# Patient Record
Sex: MALE | Race: BLACK OR AFRICAN AMERICAN | Employment: UNEMPLOYED | URBAN - NONMETROPOLITAN AREA
[De-identification: names, ages, dates, MRNs, and addresses within clinical notes are randomized per-mention and may not be internally consistent; named-entity substitution may affect disease eponyms.]

---

## 2019-04-08 ENCOUNTER — APPOINTMENT (OUTPATIENT)
Dept: GENERAL RADIOLOGY | Age: 24
End: 2019-04-08
Payer: COMMERCIAL

## 2019-04-08 ENCOUNTER — HOSPITAL ENCOUNTER (EMERGENCY)
Age: 24
Discharge: HOME | End: 2019-04-08
Attending: EMERGENCY MEDICINE
Payer: COMMERCIAL

## 2019-04-08 VITALS
HEART RATE: 70 BPM | TEMPERATURE: 98 F | DIASTOLIC BLOOD PRESSURE: 89 MMHG | SYSTOLIC BLOOD PRESSURE: 133 MMHG | BODY MASS INDEX: 29.52 KG/M2 | OXYGEN SATURATION: 99 % | RESPIRATION RATE: 18 BRPM | WEIGHT: 230 LBS | HEIGHT: 74 IN

## 2019-04-08 DIAGNOSIS — S56.912A FOREARM STRAIN, LEFT, INITIAL ENCOUNTER: Primary | ICD-10-CM

## 2019-04-08 PROCEDURE — 96372 THER/PROPH/DIAG INJ SC/IM: CPT

## 2019-04-08 PROCEDURE — 99283 EMERGENCY DEPT VISIT LOW MDM: CPT

## 2019-04-08 PROCEDURE — 73090 X-RAY EXAM OF FOREARM: CPT

## 2019-04-08 PROCEDURE — 6360000002 HC RX W HCPCS: Performed by: EMERGENCY MEDICINE

## 2019-04-08 RX ORDER — NAPROXEN 500 MG/1
500 TABLET ORAL 2 TIMES DAILY PRN
Qty: 30 TABLET | Refills: 0 | Status: SHIPPED | OUTPATIENT
Start: 2019-04-08 | End: 2019-04-18

## 2019-04-08 RX ORDER — ACETAMINOPHEN 500 MG
1000 TABLET ORAL EVERY 8 HOURS PRN
Qty: 30 TABLET | Refills: 0 | Status: SHIPPED | OUTPATIENT
Start: 2019-04-08

## 2019-04-08 RX ORDER — KETOROLAC TROMETHAMINE 30 MG/ML
30 INJECTION, SOLUTION INTRAMUSCULAR; INTRAVENOUS ONCE
Status: COMPLETED | OUTPATIENT
Start: 2019-04-08 | End: 2019-04-08

## 2019-04-08 RX ADMIN — KETOROLAC TROMETHAMINE 30 MG: 30 INJECTION, SOLUTION INTRAMUSCULAR; INTRAVENOUS at 19:16

## 2019-04-08 SDOH — HEALTH STABILITY: MENTAL HEALTH: HOW OFTEN DO YOU HAVE A DRINK CONTAINING ALCOHOL?: NEVER

## 2019-04-08 ASSESSMENT — PAIN SCALES - GENERAL
PAINLEVEL_OUTOF10: 8
PAINLEVEL_OUTOF10: 9

## 2019-04-08 ASSESSMENT — PAIN DESCRIPTION - ORIENTATION: ORIENTATION: LEFT

## 2019-04-08 ASSESSMENT — PAIN DESCRIPTION - PAIN TYPE: TYPE: ACUTE PAIN

## 2019-04-08 ASSESSMENT — PAIN DESCRIPTION - LOCATION: LOCATION: ARM

## 2019-04-08 NOTE — ED PROVIDER NOTES
EMERGENCY DEPARTMENT H&P    Patient Name:  Rhona Mix  :  1995  MRN:  [id number]  Date of Visit:  2019    Triage Chief Complaint:   Arm Pain (pt reports left forearm injury, inital injury was about 1 week ago, today during drills had a helmet hit the same area on his forearm, reports that pain has worsened, and had a decrease in sensation.)    HPI:  Rhona Mix is a 26 y.o. male who presents for complaint of pain and swelling to  Left forearm. Patient notes that approximately one week ago, he struck his left forearm against another player's helmet during football practice and had pain / swelling. He notes that he saw the team  who recommended he take ibuprofen for a few days which the pt did and notes the pain improved. He return to playing a few days ago and today was practicing again when he notes he grabbed another players jersey and pulled down very hard and had severe pain in the left anterior forearm. He was unable to continue practicing due to pain. His  noticed his forearm was slightly swollen and recommended pt come to the ED to get x-rays. He did not take anything for pain PTA. Pain is aching in nature, rated 8/10, does not radiate, has been constant since onset, is worse with palpation of the forearm or with movement. He notes the forearm felt \"tingly and numb\" earlier but this has resolved. Denies pain in the left hand, wrist, elbow or shoulder or any other areas. ROS:  Review of Systems  At least 4 point ROS was performed and is negative except as stated in HPI above. Review of systems obtained from the patient. Past Medical History:   Diagnosis Date    Seizures ([address]      History reviewed. No pertinent surgical history. History reviewed. No pertinent family history.   Social History     Socioeconomic History    Marital status: Single     Spouse name: Not on file    Number of children: Not on file    Years of education: Not on file    Highest education level: Comment: Please note this report has been produced using speech recognition software and may contain errors related to that system including errors in grammar, punctuation, and spelling, as well as words and phrases that may be inappropriate. If there are any questions or concerns please feel free to contact the dictating provider for clarification.     Electronically Signed:        Katherine Jay DO  04/08/19 4779

## 2019-04-08 NOTE — ED NOTES
No evidence of medications reaction. Discharged with instructions and rx x 2. Pt acknowledges understanding. Ambulatory at discharge.       Reagan Dee RN  04/08/19 [id number]

## 2019-04-08 NOTE — ED NOTES
Ace wrap applied to left forearm and ice pack applied. Medicated with IM Toradol as ordered. Pt instructed on need to be checked in 30\" prior to discharge. Pt acknowledges understanding.       Roosevelt Coronado RN  04/08/19 [id number]